# Patient Record
Sex: MALE | Race: WHITE | NOT HISPANIC OR LATINO | URBAN - METROPOLITAN AREA
[De-identification: names, ages, dates, MRNs, and addresses within clinical notes are randomized per-mention and may not be internally consistent; named-entity substitution may affect disease eponyms.]

---

## 2018-04-28 ENCOUNTER — OFFICE VISIT (OUTPATIENT)
Dept: URGENT CARE | Facility: CLINIC | Age: 48
End: 2018-04-28
Payer: COMMERCIAL

## 2018-04-28 VITALS
HEIGHT: 69 IN | SYSTOLIC BLOOD PRESSURE: 126 MMHG | DIASTOLIC BLOOD PRESSURE: 90 MMHG | HEART RATE: 69 BPM | BODY MASS INDEX: 42.95 KG/M2 | WEIGHT: 290 LBS | RESPIRATION RATE: 20 BRPM | TEMPERATURE: 97.9 F | OXYGEN SATURATION: 98 %

## 2018-04-28 DIAGNOSIS — H57.89 EYE IRRITATION: Primary | ICD-10-CM

## 2018-04-28 PROCEDURE — 99203 OFFICE O/P NEW LOW 30 MIN: CPT | Performed by: NURSE PRACTITIONER

## 2018-04-28 RX ORDER — LOSARTAN POTASSIUM AND HYDROCHLOROTHIAZIDE 25; 100 MG/1; MG/1
1 TABLET ORAL DAILY
Refills: 4 | COMMUNITY
Start: 2018-04-03

## 2018-04-28 RX ORDER — PROPARACAINE HYDROCHLORIDE 5 MG/ML
2 SOLUTION/ DROPS OPHTHALMIC ONCE
Qty: 15 ML | Refills: 0 | Status: SHIPPED | OUTPATIENT
Start: 2018-04-28 | End: 2018-04-28

## 2018-04-28 RX ORDER — FLUTICASONE PROPIONATE 50 MCG
1 SPRAY, SUSPENSION (ML) NASAL DAILY
COMMUNITY

## 2018-04-28 RX ORDER — TOBRAMYCIN 3 MG/ML
1 SOLUTION/ DROPS OPHTHALMIC
Qty: 5 ML | Refills: 0 | Status: SHIPPED | OUTPATIENT
Start: 2018-04-28 | End: 2018-05-05

## 2018-04-28 RX ORDER — PANTOPRAZOLE SODIUM 40 MG/1
40 TABLET, DELAYED RELEASE ORAL DAILY
Refills: 4 | COMMUNITY
Start: 2018-04-17

## 2018-04-28 NOTE — PATIENT INSTRUCTIONS
You have been prescribed tobramycin eye solution - use as directed  Cool compress to eye for pain  Take tylenol or motrin as needed for pain    If not better or worse in 3 days - see your eye doctor  If symptoms worsen go to the ED    Eye Foreign Body   WHAT Christine:   You may have pain, sensitivity to light, or blurry vision for a few days  DISCHARGE INSTRUCTIONS:   Return to the emergency department if:   · You suddenly lose your vision  · You have severe eye pain  Contact your healthcare provider or ophthalmologist if:   · You have new or worse eye swelling  · Your symptoms do not get better, even after the foreign body is removed  · You have white or yellow fluid draining from your eye  · You have questions or concerns about your condition or care  Medicines: You may  need any of the following:  · Eye drops or eye ointment  may be given to prevent an infection and decrease pain  · NSAIDs , such as ibuprofen, help decrease swelling, pain, and fever  NSAIDs can cause stomach bleeding or kidney problems in certain people  If you take blood thinner medicine, always ask your healthcare provider if NSAIDs are safe for you  Always read the medicine label and follow directions  · Prescription pain medicine  may be given  Ask your healthcare provider how to take this medicine safely  Some prescription pain medicines contain acetaminophen  Do not take other medicines that contain acetaminophen without talking to your healthcare provider  Too much acetaminophen may cause liver damage  Prescription pain medicine may cause constipation  Ask your healthcare provider how to prevent or treat constipation  · Take your medicine as directed  Contact your healthcare provider if you think your medicine is not helping or if you have side effects  Tell him of her if you are allergic to any medicine  Keep a list of the medicines, vitamins, and herbs you take   Include the amounts, and when and why you take them  Bring the list or the pill bottles to follow-up visits  Carry your medicine list with you in case of an emergency  Help your eye heal:   · Do not rub your eye  This may cause more damage or infection  · Do not wear your contacts lenses until your eye heals  Ask your healthcare provider how long to follow this direction  · Wear sunglasses as directed  Sunglasses help protect the eye and decrease sensitivity to light  Prevent another EFB:   · Wear safety glasses, eye shields, or goggles  These items can prevent eye injury  Make sure the eyewear wraps around the sides of your face  Wear these items while you work with chemicals, metal, wood, or bodily fluids such as blood  Also wear protective eyewear during sports such as racquetball or swimming  Do not use regular eye glasses for eye protection  They will not protect your eyes from foreign bodies or chemicals  · Use contact lenses as directed  Wash your hands before you clean, insert, or remove your contacts  Insert and remove contact lenses correctly  Clean and change your contacts as directed to help prevent eye damage or infection  Follow up with your healthcare provider or ophthalmologist in 1 to 2 days:  Write down your questions so you remember to ask them during your visits  © 2017 [employer]  Information is for End User's use only and may not be sold, redistributed or otherwise used for commercial purposes  All illustrations and images included in CareNotes® are the copyrighted property of [employer]  or [employer]  The above information is an  only  It is not intended as medical advice for individual conditions or treatments  Talk to your doctor, nurse or pharmacist before following any medical regimen to see if it is safe and effective for you

## 2018-04-28 NOTE — PROGRESS NOTES
[facility]        NAME: Char Lopez is a 56 y o  male  : 1970    MRN: [id number]  DATE: 2018  TIME: 3:55 PM    Assessment and Plan   Eye irritation [H57 8]  1  Eye irritation  proparacaine (ALCAINE) 0 5 % ophthalmic solution    fluorescein sodium sterile 1 mg ophthalmic strip 1 strip    tobramycin (TOBREX) 0 3 % SOLN         Patient Instructions       Follow up with PCP in 3-5 days  Proceed to  ER if symptoms worsen  You have been prescribed tobramycin eye solution - use as directed  Cool compress to eye for pain  Take tylenol or motrin as needed for pain    If not better or worse in 3 days - see your eye doctor  If symptoms worsen go to the ED        Chief Complaint     Chief Complaint   Patient presents with    Foreign Body in Eye     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 went into left eye  a few hours later pt rinsed his left eye with eye wash  History of Present Illness       This is a 56year old male who says was working with [employer] and board  He states he feels like something is in his left eye  He denies contact use and states he did flush it out  Foreign Body in [address]   Eyes: Positive for pain, redness and itching  All other systems reviewed and are negative          Current Medications       Current Outpatient Prescriptions:     fluticasone (FLONASE) 50 mcg/act nasal spray, 1 spray into each nostril daily, Disp: , Rfl:     losartan-hydrochlorothiazide (HYZAAR) 100-25 MG per tablet, Take 1 tablet by mouth daily, Disp: , Rfl: 4    pantoprazole (PROTONIX) 40 mg tablet, Take 40 mg by mouth daily, Disp: , Rfl: 4    proparacaine (ALCAINE) 0 5 % ophthalmic solution, Administer 2 drops into the left eye once for 1 dose, Disp: 15 mL, Rfl: 0    tobramycin (TOBREX) 0 3 % SOLN, Administer 1 drop into the left eye every 4 (four) hours while awake for 7 days, Disp: 5 mL, Rfl: 0    Current Facility-Administered Medications:     fluorescein sodium sterile 1 mg ophthalmic strip 1 strip, 1 strip, Left Eye, Once, ERMIAS [facility]    Current Allergies     Allergies as of 04/28/2018    (No Known Allergies)            The following portions of the patient's history were reviewed and updated as appropriate: allergies, current medications, past family history, past medical history, past social history, past surgical history and problem list      Past Medical History:   Diagnosis Date    Allergic     GERD (gastroesophageal reflux disease)     Hypertension        History reviewed  No pertinent surgical history  No family history on file  Medications have been verified  Objective   /90 (BP Location: Left arm, Patient Position: Sitting)   Pulse 69   Temp 97 9 °F (36 6 °C) (Tympanic)   Resp 20   Ht 5' 9" (1 753 m)   Wt 132 kg (290 lb)   SpO2 98%   BMI 42 83 kg/m²        Physical Exam     Physical Exam   Constitutional: He appears well-developed and well-nourished  No distress  Eyes: Pupils are equal, round, and reactive to light  Left sclera is red  No visible foreign body seen  Skin: He is not diaphoretic  Left eye 2 gtts proparacaine drops  Fluorescein stain applied  No corneal abrasion noted  Eye was flushed and no foreign body seen